# Patient Record
Sex: FEMALE | Race: BLACK OR AFRICAN AMERICAN
[De-identification: names, ages, dates, MRNs, and addresses within clinical notes are randomized per-mention and may not be internally consistent; named-entity substitution may affect disease eponyms.]

---

## 2021-10-08 ENCOUNTER — HOSPITAL ENCOUNTER (INPATIENT)
Dept: HOSPITAL 63 - ER | Age: 86
LOS: 7 days | Discharge: HOSPICE | DRG: 177 | End: 2021-10-15
Attending: INTERNAL MEDICINE | Admitting: INTERNAL MEDICINE
Payer: MEDICARE

## 2021-10-08 VITALS — BODY MASS INDEX: 18.94 KG/M2 | HEIGHT: 61 IN | WEIGHT: 100.31 LBS

## 2021-10-08 VITALS — DIASTOLIC BLOOD PRESSURE: 61 MMHG | SYSTOLIC BLOOD PRESSURE: 101 MMHG

## 2021-10-08 VITALS — SYSTOLIC BLOOD PRESSURE: 96 MMHG | DIASTOLIC BLOOD PRESSURE: 54 MMHG

## 2021-10-08 DIAGNOSIS — I10: ICD-10-CM

## 2021-10-08 DIAGNOSIS — J12.82: ICD-10-CM

## 2021-10-08 DIAGNOSIS — Z51.5: ICD-10-CM

## 2021-10-08 DIAGNOSIS — E78.5: ICD-10-CM

## 2021-10-08 DIAGNOSIS — J12.89: ICD-10-CM

## 2021-10-08 DIAGNOSIS — Z90.49: ICD-10-CM

## 2021-10-08 DIAGNOSIS — U07.1: Primary | ICD-10-CM

## 2021-10-08 DIAGNOSIS — N17.9: ICD-10-CM

## 2021-10-08 DIAGNOSIS — D64.9: ICD-10-CM

## 2021-10-08 DIAGNOSIS — E86.0: ICD-10-CM

## 2021-10-08 DIAGNOSIS — J96.21: ICD-10-CM

## 2021-10-08 DIAGNOSIS — M06.9: ICD-10-CM

## 2021-10-08 DIAGNOSIS — Z88.8: ICD-10-CM

## 2021-10-08 LAB
ALBUMIN SERPL-MCNC: 3 G/DL (ref 3.4–5)
ALBUMIN/GLOB SERPL: 0.7 {RATIO} (ref 1–1.7)
ALP SERPL-CCNC: 64 U/L (ref 46–116)
ALT SERPL-CCNC: 31 U/L (ref 14–59)
ANION GAP SERPL CALC-SCNC: 14 MMOL/L (ref 6–14)
APTT PPP: YELLOW S
AST SERPL-CCNC: 65 U/L (ref 15–37)
BACTERIA #/AREA URNS HPF: 0 /HPF
BASOPHILS # BLD AUTO: 0 X10^3/UL (ref 0–0.2)
BASOPHILS NFR BLD: 0 % (ref 0–3)
BILIRUB SERPL-MCNC: 0.2 MG/DL (ref 0.2–1)
BILIRUB UR QL STRIP: (no result)
BUN/CREAT SERPL: 22 (ref 6–20)
CA-I SERPL ISE-MCNC: 39 MG/DL (ref 7–20)
CALCIUM SERPL-MCNC: 8 MG/DL (ref 8.5–10.1)
CHLORIDE SERPL-SCNC: 100 MMOL/L (ref 98–107)
CO2 SERPL-SCNC: 23 MMOL/L (ref 21–32)
CREAT SERPL-MCNC: 1.8 MG/DL (ref 0.6–1)
EOSINOPHIL NFR BLD: 0 % (ref 0–3)
EOSINOPHIL NFR BLD: 0 X10^3/UL (ref 0–0.7)
ERYTHROCYTE [DISTWIDTH] IN BLOOD BY AUTOMATED COUNT: 16.1 % (ref 11.5–14.5)
FIBRINOGEN PPP-MCNC: (no result) MG/DL
GFR SERPLBLD BASED ON 1.73 SQ M-ARVRAT: 32.4 ML/MIN
GLOBULIN SER-MCNC: 4.3 G/DL (ref 2.2–3.8)
GLUCOSE SERPL-MCNC: 81 MG/DL (ref 70–99)
GLUCOSE UR STRIP-MCNC: (no result) MG/DL
HCT VFR BLD CALC: 34 % (ref 36–47)
HGB BLD-MCNC: 10.9 G/DL (ref 12–15.5)
LYMPHOCYTES # BLD: 0.4 X10^3/UL (ref 1–4.8)
LYMPHOCYTES NFR BLD AUTO: 9 % (ref 24–48)
MCH RBC QN AUTO: 31 PG (ref 25–35)
MCHC RBC AUTO-ENTMCNC: 32 G/DL (ref 31–37)
MCV RBC AUTO: 95 FL (ref 79–100)
MONO #: 0.4 X10^3/UL (ref 0–1.1)
MONOCYTES NFR BLD: 10 % (ref 0–9)
NEUT #: 3.8 X10^3UL (ref 1.8–7.7)
NEUTROPHILS NFR BLD AUTO: 81 % (ref 31–73)
NITRITE UR QL STRIP: (no result)
PLATELET # BLD AUTO: 228 X10^3/UL (ref 140–400)
POTASSIUM SERPL-SCNC: 4.1 MMOL/L (ref 3.5–5.1)
PROT SERPL-MCNC: 7.3 G/DL (ref 6.4–8.2)
RBC # BLD AUTO: 3.57 X10^6/UL (ref 3.5–5.4)
SODIUM SERPL-SCNC: 137 MMOL/L (ref 136–145)
SP GR UR STRIP: 1.01
UROBILINOGEN UR-MCNC: 0.2 MG/DL
WBC # BLD AUTO: 4.6 X10^3/UL (ref 4–11)
WBC #/AREA URNS HPF: (no result) /HPF (ref 0–4)

## 2021-10-08 PROCEDURE — 36415 COLL VENOUS BLD VENIPUNCTURE: CPT

## 2021-10-08 PROCEDURE — 83605 ASSAY OF LACTIC ACID: CPT

## 2021-10-08 PROCEDURE — 85379 FIBRIN DEGRADATION QUANT: CPT

## 2021-10-08 PROCEDURE — 94760 N-INVAS EAR/PLS OXIMETRY 1: CPT

## 2021-10-08 PROCEDURE — 82803 BLOOD GASES ANY COMBINATION: CPT

## 2021-10-08 PROCEDURE — 80053 COMPREHEN METABOLIC PANEL: CPT

## 2021-10-08 PROCEDURE — 87426 SARSCOV CORONAVIRUS AG IA: CPT

## 2021-10-08 PROCEDURE — 85027 COMPLETE CBC AUTOMATED: CPT

## 2021-10-08 PROCEDURE — 81001 URINALYSIS AUTO W/SCOPE: CPT

## 2021-10-08 PROCEDURE — 96365 THER/PROPH/DIAG IV INF INIT: CPT

## 2021-10-08 PROCEDURE — 86140 C-REACTIVE PROTEIN: CPT

## 2021-10-08 PROCEDURE — 93005 ELECTROCARDIOGRAM TRACING: CPT

## 2021-10-08 PROCEDURE — 96361 HYDRATE IV INFUSION ADD-ON: CPT

## 2021-10-08 PROCEDURE — 84484 ASSAY OF TROPONIN QUANT: CPT

## 2021-10-08 PROCEDURE — 85025 COMPLETE CBC W/AUTO DIFF WBC: CPT

## 2021-10-08 PROCEDURE — 36600 WITHDRAWAL OF ARTERIAL BLOOD: CPT

## 2021-10-08 PROCEDURE — 94640 AIRWAY INHALATION TREATMENT: CPT

## 2021-10-08 PROCEDURE — 80048 BASIC METABOLIC PNL TOTAL CA: CPT

## 2021-10-08 PROCEDURE — 71045 X-RAY EXAM CHEST 1 VIEW: CPT

## 2021-10-08 PROCEDURE — 87040 BLOOD CULTURE FOR BACTERIA: CPT

## 2021-10-08 PROCEDURE — 72125 CT NECK SPINE W/O DYE: CPT

## 2021-10-08 PROCEDURE — 96367 TX/PROPH/DG ADDL SEQ IV INF: CPT

## 2021-10-08 PROCEDURE — 71275 CT ANGIOGRAPHY CHEST: CPT

## 2021-10-08 PROCEDURE — 70450 CT HEAD/BRAIN W/O DYE: CPT

## 2021-10-08 RX ADMIN — HEPARIN SODIUM SCH UNIT: 5000 INJECTION, SOLUTION INTRAVENOUS; SUBCUTANEOUS at 22:00

## 2021-10-08 RX ADMIN — HEPARIN SODIUM SCH UNIT: 5000 INJECTION, SOLUTION INTRAVENOUS; SUBCUTANEOUS at 21:45

## 2021-10-08 RX ADMIN — SODIUM CHLORIDE SCH MLS/HR: 0.9 INJECTION, SOLUTION INTRAVENOUS at 21:45

## 2021-10-08 NOTE — PHYS DOC
General Adult


EDM:


Chief Complaint:  WEAKNESS/GENERALIZED





HPI:


HPI:


Patient is an 86-year-old female who presents to the emergency department for 

generalized weakness, shortness of breath, generalized chest pain, nonproductive

cough, fevers for 2 weeks.  Patient lives at home and walks with a cane or a wal

ker.  Patient denies any sick exposures.  She is not vaccinated for COVID-19.  

She denies any nausea, vomiting, dysuria.  Patient states that she fell 2 days 

ago because she was weak, she reports hitting her head but denies loss of 

consciousness, no blood thinner use.  Patient states she does not wear oxygen at

home.  Upon ER arrival patient is noted to be febrile and hypoxic with room air 

oxygen saturation of 83%.





Review of Systems:


Review of Systems:


14 body systems of the review of systems have been reviewed.  See HPI for 

pertinent positive and negative responses, otherwise all other systems are n

egative, nonpertinent or noncontributory





Physical Exam:


PE:





Constitutional: Well developed, well nourished, no acute distress, non-toxic 

appearance. []


HENT: Normocephalic, ecchymosis noted to right frontal aspect of head, no 

rhinorrhea, bilateral external ears normal, oropharynx moist, no oral exudates, 

nose normal. []


Eyes: PERRLA, EOMI, conjunctiva normal, no discharge. [] 


Neck: Normal range of motion, no tenderness, supple, no stridor. [] 


Cardiovascular:Heart rate regular rhythm, no murmur []


Lungs & Thorax:  Bilateral breath sounds clear but diminished to auscultation, 

hypoxic []


Abdomen: Bowel sounds normal, soft, no tenderness, no masses, no pulsatile 

masses. [] 


Skin: Warm, dry, no erythema, no rash. [] 


Back: Normal range of motion


Extremities: No tenderness, no cyanosis, no clubbing, ROM intact, no edema, 

arthritic changes in hands bilaterally. [] 


Neurologic: Alert and oriented X 3, normal motor function, normal sensory 

function, no focal deficits noted, patient moving all 4 extremities equally. []


Psychologic: Affect normal, judgement normal, mood normal. []





Current Patient Data:


Labs:





Laboratory Tests








Test


 10/8/21


14:00


 


White Blood Count 4.6 x10^3/uL 


 


Red Blood Count 3.57 x10^6/uL 


 


Hemoglobin 10.9 g/dL 


 


Hematocrit 34.0 % 


 


Mean Corpuscular Volume 95 fL 


 


Mean Corpuscular Hemoglobin 31 pg 


 


Mean Corpuscular Hemoglobin


Concent 32 g/dL 





 


Red Cell Distribution Width 16.1 % 


 


Platelet Count 228 x10^3/uL 


 


Neutrophils (%) (Auto) 81 % 


 


Lymphocytes (%) (Auto) 9 % 


 


Monocytes (%) (Auto) 10 % 


 


Eosinophils (%) (Auto) 0 % 


 


Basophils (%) (Auto) 0 % 


 


Neutrophils # (Auto) 3.8 x10^3uL 


 


Lymphocytes # (Auto) 0.4 x10^3/uL 


 


Monocytes # (Auto) 0.4 x10^3/uL 


 


Eosinophils # (Auto) 0.0 x10^3/uL 


 


Basophils # (Auto) 0.0 x10^3/uL 


 


Sodium Level 137 mmol/L 


 


Potassium Level 4.1 mmol/L 


 


Chloride Level 100 mmol/L 


 


Carbon Dioxide Level 23 mmol/L 


 


Anion Gap 14 


 


Blood Urea Nitrogen 39 mg/dL 


 


Creatinine 1.8 mg/dL 


 


Estimated GFR


(Cockcroft-Gault) 32.4 





 


BUN/Creatinine Ratio 22 


 


Glucose Level 81 mg/dL 


 


Calcium Level 8.0 mg/dL 


 


Total Bilirubin 0.2 mg/dL 


 


Aspartate Amino Transf


(AST/SGOT) 65 U/L 





 


Alanine Aminotransferase


(ALT/SGPT) 31 U/L 





 


Alkaline Phosphatase 64 U/L 


 


Troponin I Quantitative < 0.017 ng/mL 


 


Total Protein 7.3 g/dL 


 


Albumin 3.0 g/dL 


 


Albumin/Globulin Ratio 0.7 








Current Medications








 Medications


  (Trade)  Dose


 Ordered  Sig/Anusha


 Route


 PRN Reason  Start Time


 Stop Time Status Last Admin


Dose Admin


 


 Sodium Chloride  1,000 ml @ 


 1,000 mls/hr  Q1H


 IV


   10/8/21 13:45


 10/8/21 14:44 DC 10/8/21 13:45





 


 Ceftriaxone


 Sodium 1 gm/


 Sodium Chloride  50 ml @ 


 100 mls/hr  1X  ONCE


 IV


   10/8/21 14:45


 10/8/21 15:14 DC  





 


 Azithromycin 500


 mg/Sodium Chloride  250 ml @ 


 250 mls/hr  1X  ONCE


 IV


   10/8/21 14:45


 10/8/21 15:44   





 


 Acetaminophen


  (Tylenol)  650 mg  1X  ONCE


 PO


   10/8/21 14:45


 10/8/21 14:49 DC  





 


 Sodium Chloride  50 ml @ As


 Directed  STK-MED ONCE


 .ROUTE


   10/8/21 15:07


 10/8/21 15:07 DC  





 


 Ceftriaxone Sodium


  (Rocephin)  1 gm  STK-MED ONCE


 .ROUTE


   10/8/21 15:07


 10/8/21 15:07 DC  





 


 Sodium Chloride  250 ml @ 


 As Directed  STK-MED ONCE


 .ROUTE


   10/8/21 15:09


 10/8/21 15:09 DC  





 


 Azithromycin


  (Zithromax)  500 mg  STK-MED ONCE


 IV


   10/8/21 15:09


 10/8/21 15:09 DC  














EKG:


EKG:


EKG performed by ER staff at 1503 shows sinus rhythm with a rate of 95, no STEMI

 read by Dr. Rob at 1510 [] EKG repeat EKG performed at 1640 shows a sinus 

rhythm with a rate of 96, no STEMI read by Dr. Rob at 1645.





Radiology/Procedures:


Radiology/Procedures:


[]PROCEDURE: CT HEAD AND CERVICAL SPINE WO





EXAM: Head and cervical spine CT without contrast.





HISTORY: Fall.





TECHNIQUE: Computed tomographic images of the head and cervical spine were 

obtained without contrast.





*One or more of the following individualized dose reduction techniques were util

ized for this examination:  


1. Automated exposure control.  


2. Adjustment of the mA and/or kV according to patient size.  


3. Use of iterative reconstruction technique.





COMPARISON: None.





FINDINGS: 





Head: There is a small right frontal scalp soft tissue hematoma. There is no 

intracranial hemorrhage. There is no mass effect or midline shift. There is no 

hydrocephalus. There is cerebral volume loss. There is decreased attenuation 

within the cerebral white matter, likely due to chronic small vessel disease. 

There are basal ganglia calcifications. There is evidence of lens surgery. The 

visualized paranasal sinuses and mastoid air cells are unremarkable. There is no

 suspicious calvarial lesion. There is a small benign osseous excrescence along 

the posterior right frontal bone near the vertex, likely a small osteoma.





Cervical spine: There is minimal multilevel degenerative listhesis. There is 

mild multilevel endplate remodeling. There is facet arthropathy at multiple 

levels. There is no fracture. There is suspected bone demineralization. There is

 multifocal groundglass opacity within the bilateral lung apices, not formally 

assessed on this exam. The degenerative changes results in mild left foraminal 

stenosis at C4-C5 and moderate right and mild left foraminal stenosis at C5-C6.





IMPRESSION:


1. Small right frontal scalp soft tissue hematoma. There is no acute 

intracranial finding.


2. Multilevel degenerative change involving the cervical spine, described in 

detail above. There is no acute cervical spine finding.


3. Cerebral volume loss and bilateral cerebral white matter changes likely due 

to chronic small vessel disease.


4. Multifocal groundglass opacities within the bilateral lung apices. Correlate 

with a dedicated chest radiograph or chest CT.





Electronically signed by: Marni Chacko MD (10/8/2021 2:32 PM) [id number]














DICTATED AND SIGNED BY:     MARNI CHACKO MD


DATE:     10/08/21 1429





CC: DERIK NEWSOME; LOREN MCMANUS MD ~MTH0 0


PROCEDURE: PORTABLE CHEST 1V





Single AP view of the chest.





Comparison:  None.





Indication: Cough and shortness of air





Findings: 





The aorta is calcified and tortuous The heart is enlarged.  There is no 

pneumothorax or effusion. There are coarse bilateral interstitial opacities. 

Additionally there appears to be a 1.8 x 1.2 cm mass in left upper lung zone.





Impression: 





1. Coarse interstitial opacities bilaterally likely chronic scarring and 

fibrosis. Additionally appears to be left upper lobe mass. Recommend further 

correlation with cross-sectional imaging.








Electronically signed by: Tanner Bauer MD (10/8/2021 1:53 PM) [id number]














DICTATED AND SIGNED BY:     TANNER BAUER MD


DATE:     10/08/21 1351





CC: DERIK NEWSOME; LOREN MCMANUS MD ~MTH0 0





Heart Score:


C/O Chest Pain:  Yes


HEART Score for Chest Pain:  








HEART Score for Chest Pain Response (Comments) Value


 


History Slighlty/Non-Suspicious (gernalized x2 weeks with cough) 0


 


ECG Normal 0


 


Age > 65 2


 


Risk Factors                            1 or 2 Risk Factors 1


 


Troponin < Normal Limit 0


 


Total  3








Risk Factors:


Risk Factors:  DM, Current or recent (<one month) smoker, HTN, HLP, family his

tory of CAD, obesity.


Risk Scores:


Score 0 - 3:  2.5% MACE over next 6 weeks - Discharge Home


Score 4 - 6:  20.3% MACE over next 6 weeks - Admit for Clinical Observation


Score 7 - 10:  72.7% MACE over next 6 weeks - Early Invasive Strategies





Course & Med Decision Making:


Course & Med Decision Making


Pertinent Labs and Imaging studies reviewed. (See chart for details)


Patient is an 86-year-old female who presents to the emergency department for 

generalized weakness, shortness of breath, cough, fevers, generalized chest pain

 x2 weeks.  Work-up in the ER consisted of blood work, urinalysis, chest x-ray, 

EKG.  Patient stated that she fell 2 days ago and hit her head, CT head and neck

 was performed.  Patient treated with IV fluids.  Patient CT scan of head and 

neck were negative for any acute findings, her chest x-ray showed bilateral 

opacities with a 1.8 x 1.2 cm lung mass.  Patient treated with antibiotics. 

Fever treated in ER.  Patient was Covid tested in the ER and it is pending at 

this time.  Negative troponin.  BUN and creatinine are mildly elevated.  I 

discussed patient's case with Dr. Hicks who agreed to admit the patient under his

 services for pneumonia, generalized weakness, hypoxia, PUI.  I notified patient

 of her lab findings and her treatment plan and she is agreeable at this time.  

Patient's vital signs continue to be stable on supplemental oxygen.  Care 

transferred at this time 1528.





Dragon Disclaimer:


Dragon Disclaimer:


This electronic medical record was generated, in whole or in part, using a voice

 recognition dictation system.





Departure


Departure:


Impression:  


   Primary Impression:  


   Pneumonia


   Qualified Codes:  J18.9 - Pneumonia, unspecified organism


   Additional Impressions:  


   Person under investigation for COVID-19


   Weakness


   Hypoxia


Disposition:  09 ADMITTED AS INPATIENT


Admitting Physician:  Geovanna Hicks


Condition:  STABLE


Referrals:  


LOREN MCMANUS MD (PCP)











DERIK NEWSOME           Oct 8, 2021 13:50

## 2021-10-08 NOTE — RAD
EXAM: Head and cervical spine CT without contrast.



HISTORY: Fall.



TECHNIQUE: Computed tomographic images of the head and cervical spine were obtained without contrast.




*One or more of the following individualized dose reduction techniques were utilized for this examina
tion:  

1. Automated exposure control.  

2. Adjustment of the mA and/or kV according to patient size.  

3. Use of iterative reconstruction technique.



COMPARISON: None.



FINDINGS: 



Head: There is a small right frontal scalp soft tissue hematoma. There is no intracranial hemorrhage.
 There is no mass effect or midline shift. There is no hydrocephalus. There is cerebral volume loss. 
There is decreased attenuation within the cerebral white matter, likely due to chronic small vessel d
isease. There are basal ganglia calcifications. There is evidence of lens surgery. The visualized par
anasal sinuses and mastoid air cells are unremarkable. There is no suspicious calvarial lesion. There
 is a small benign osseous excrescence along the posterior right frontal bone near the vertex, likely
 a small osteoma.



Cervical spine: There is minimal multilevel degenerative listhesis. There is mild multilevel endplate
 remodeling. There is facet arthropathy at multiple levels. There is no fracture. There is suspected 
bone demineralization. There is multifocal groundglass opacity within the bilateral lung apices, not 
formally assessed on this exam. The degenerative changes results in mild left foraminal stenosis at C
4-C5 and moderate right and mild left foraminal stenosis at C5-C6.



IMPRESSION:

1. Small right frontal scalp soft tissue hematoma. There is no acute intracranial finding.

2. Multilevel degenerative change involving the cervical spine, described in detail above. There is n
o acute cervical spine finding.

3. Cerebral volume loss and bilateral cerebral white matter changes likely due to chronic small vesse
l disease.

4. Multifocal groundglass opacities within the bilateral lung apices. Correlate with a dedicated ches
t radiograph or chest CT.



Electronically signed by: Marni Harris MD (10/8/2021 2:32 PM) [id number]

## 2021-10-08 NOTE — EKG
[facility] [address], [place] [address]

Test Date:    2021-10-08               Test Time:    13:58:03

Pat Name:     DELFINA CORDOVA               Department:   

Patient ID:   [id number]           Room:          

Gender:       F                        Technician:   TAIWO

:          1935               Requested By: DERIK NEWSOME

Order Number: [id number]            Reading MD:   Norman Brooks

                                 Measurements

Intervals                              Axis          

Rate:         103                      P:            40

TN:           172                      QRS:          12

QRSD:         72                       T:            31

QT:           314                                    

QTc:          413                                    

                           Interpretive Statements

SINUS TACHYCARDIA

LOW LIMB LEAD VOLTAGE

QRS(T) CONTOUR ABNORMALITY

CONSISTENT WITH INFERIOR INFARCT

Electronically Signed On 10- 16:48:16 CDT by Norman Brooks

## 2021-10-08 NOTE — EKG
[facility] [address], [place] [address]

Test Date:    2021-10-08               Test Time:    15:03:10

Pat Name:     DELFINA CORDOVA               Department:   

Patient ID:   [id number]           Room:          

Gender:       F                        Technician:   TAIWO

:          1935               Requested By: DERIK NEWSOME

Order Number: [id number]            Reading MD:   Norman Brooks

                                 Measurements

Intervals                              Axis          

Rate:         95                       P:            49

VT:           174                      QRS:          13

QRSD:         80                       T:            42

QT:           334                                    

QTc:          423                                    

                           Interpretive Statements

SINUS RHYTHM

LOW LIMB LEAD VOLTAGE

Electronically Signed On 10- 16:47:49 CDT by Norman Brooks

## 2021-10-08 NOTE — RAD
Single AP view of the chest.



Comparison:  None.



Indication: Cough and shortness of air



Findings: 



The aorta is calcified and tortuous The heart is enlarged.  There is no pneumothorax or effusion. The
re are coarse bilateral interstitial opacities. Additionally there appears to be a 1.8 x 1.2 cm mass 
in left upper lung zone.



Impression: 



1. Coarse interstitial opacities bilaterally likely chronic scarring and fibrosis. Additionally appea
rs to be left upper lobe mass. Recommend further correlation with cross-sectional imaging.





Electronically signed by: Tanner Bauer MD (10/8/2021 1:53 PM) [id number]

## 2021-10-09 VITALS — SYSTOLIC BLOOD PRESSURE: 112 MMHG | DIASTOLIC BLOOD PRESSURE: 65 MMHG

## 2021-10-09 VITALS — SYSTOLIC BLOOD PRESSURE: 148 MMHG | DIASTOLIC BLOOD PRESSURE: 64 MMHG

## 2021-10-09 VITALS — DIASTOLIC BLOOD PRESSURE: 63 MMHG | SYSTOLIC BLOOD PRESSURE: 116 MMHG

## 2021-10-09 VITALS — SYSTOLIC BLOOD PRESSURE: 112 MMHG | DIASTOLIC BLOOD PRESSURE: 62 MMHG

## 2021-10-09 VITALS — DIASTOLIC BLOOD PRESSURE: 73 MMHG | SYSTOLIC BLOOD PRESSURE: 144 MMHG

## 2021-10-09 LAB
ALBUMIN SERPL-MCNC: 2.4 G/DL (ref 3.4–5)
ALBUMIN/GLOB SERPL: 0.6 {RATIO} (ref 1–1.7)
ALP SERPL-CCNC: 63 U/L (ref 46–116)
ALT SERPL-CCNC: 33 U/L (ref 14–59)
ANION GAP SERPL CALC-SCNC: 10 MMOL/L (ref 6–14)
AST SERPL-CCNC: 70 U/L (ref 15–37)
BASOPHILS # BLD AUTO: 0 X10^3/UL (ref 0–0.2)
BASOPHILS NFR BLD: 0 % (ref 0–3)
BILIRUB SERPL-MCNC: 0.2 MG/DL (ref 0.2–1)
BUN/CREAT SERPL: 21 (ref 6–20)
CA-I SERPL ISE-MCNC: 27 MG/DL (ref 7–20)
CALCIUM SERPL-MCNC: 7.1 MG/DL (ref 8.5–10.1)
CHLORIDE SERPL-SCNC: 110 MMOL/L (ref 98–107)
CO2 SERPL-SCNC: 22 MMOL/L (ref 21–32)
CREAT SERPL-MCNC: 1.3 MG/DL (ref 0.6–1)
EOSINOPHIL NFR BLD: 0 % (ref 0–3)
EOSINOPHIL NFR BLD: 0 X10^3/UL (ref 0–0.7)
ERYTHROCYTE [DISTWIDTH] IN BLOOD BY AUTOMATED COUNT: 16.2 % (ref 11.5–14.5)
GFR SERPLBLD BASED ON 1.73 SQ M-ARVRAT: 47.2 ML/MIN
GLOBULIN SER-MCNC: 3.9 G/DL (ref 2.2–3.8)
GLUCOSE SERPL-MCNC: 62 MG/DL (ref 70–99)
HCT VFR BLD CALC: 30.9 % (ref 36–47)
HGB BLD-MCNC: 9.9 G/DL (ref 12–15.5)
LYMPHOCYTES # BLD: 0.6 X10^3/UL (ref 1–4.8)
LYMPHOCYTES NFR BLD AUTO: 15 % (ref 24–48)
MCH RBC QN AUTO: 31 PG (ref 25–35)
MCHC RBC AUTO-ENTMCNC: 32 G/DL (ref 31–37)
MCV RBC AUTO: 96 FL (ref 79–100)
MONO #: 0.3 X10^3/UL (ref 0–1.1)
MONOCYTES NFR BLD: 8 % (ref 0–9)
NEUT #: 3.2 X10^3UL (ref 1.8–7.7)
NEUTROPHILS NFR BLD AUTO: 77 % (ref 31–73)
PLATELET # BLD AUTO: 227 X10^3/UL (ref 140–400)
POTASSIUM SERPL-SCNC: 3.9 MMOL/L (ref 3.5–5.1)
PROT SERPL-MCNC: 6.3 G/DL (ref 6.4–8.2)
RBC # BLD AUTO: 3.21 X10^6/UL (ref 3.5–5.4)
SODIUM SERPL-SCNC: 142 MMOL/L (ref 136–145)
WBC # BLD AUTO: 4.2 X10^3/UL (ref 4–11)

## 2021-10-09 PROCEDURE — XW033E5 INTRODUCTION OF REMDESIVIR ANTI-INFECTIVE INTO PERIPHERAL VEIN, PERCUTANEOUS APPROACH, NEW TECHNOLOGY GROUP 5: ICD-10-PCS | Performed by: INTERNAL MEDICINE

## 2021-10-09 RX ADMIN — SODIUM CHLORIDE SCH MLS/HR: 0.9 INJECTION, SOLUTION INTRAVENOUS at 05:30

## 2021-10-09 RX ADMIN — SODIUM CHLORIDE SCH MLS/HR: 0.9 INJECTION, SOLUTION INTRAVENOUS at 17:03

## 2021-10-09 RX ADMIN — DEXAMETHASONE SODIUM PHOSPHATE SCH MG: 10 INJECTION INTRAMUSCULAR; INTRAVENOUS at 20:01

## 2021-10-09 RX ADMIN — HEPARIN SODIUM SCH UNIT: 5000 INJECTION, SOLUTION INTRAVENOUS; SUBCUTANEOUS at 20:00

## 2021-10-09 RX ADMIN — HEPARIN SODIUM SCH UNIT: 5000 INJECTION, SOLUTION INTRAVENOUS; SUBCUTANEOUS at 14:34

## 2021-10-09 RX ADMIN — HEPARIN SODIUM SCH UNIT: 5000 INJECTION, SOLUTION INTRAVENOUS; SUBCUTANEOUS at 05:30

## 2021-10-09 RX ADMIN — ACETAMINOPHEN PRN MG: 325 TABLET, FILM COATED ORAL at 15:24

## 2021-10-09 RX ADMIN — AZITHROMYCIN SCH MG: 250 TABLET, FILM COATED ORAL at 08:40

## 2021-10-10 VITALS — DIASTOLIC BLOOD PRESSURE: 82 MMHG | SYSTOLIC BLOOD PRESSURE: 164 MMHG

## 2021-10-10 VITALS — DIASTOLIC BLOOD PRESSURE: 62 MMHG | SYSTOLIC BLOOD PRESSURE: 128 MMHG

## 2021-10-10 VITALS — DIASTOLIC BLOOD PRESSURE: 60 MMHG | SYSTOLIC BLOOD PRESSURE: 129 MMHG

## 2021-10-10 VITALS — DIASTOLIC BLOOD PRESSURE: 61 MMHG | SYSTOLIC BLOOD PRESSURE: 143 MMHG

## 2021-10-10 VITALS — DIASTOLIC BLOOD PRESSURE: 68 MMHG | SYSTOLIC BLOOD PRESSURE: 149 MMHG

## 2021-10-10 VITALS — DIASTOLIC BLOOD PRESSURE: 76 MMHG | SYSTOLIC BLOOD PRESSURE: 175 MMHG

## 2021-10-10 VITALS — DIASTOLIC BLOOD PRESSURE: 76 MMHG | SYSTOLIC BLOOD PRESSURE: 173 MMHG

## 2021-10-10 VITALS — SYSTOLIC BLOOD PRESSURE: 163 MMHG | DIASTOLIC BLOOD PRESSURE: 72 MMHG

## 2021-10-10 VITALS — SYSTOLIC BLOOD PRESSURE: 167 MMHG | DIASTOLIC BLOOD PRESSURE: 82 MMHG

## 2021-10-10 VITALS — DIASTOLIC BLOOD PRESSURE: 63 MMHG | SYSTOLIC BLOOD PRESSURE: 133 MMHG

## 2021-10-10 VITALS — DIASTOLIC BLOOD PRESSURE: 80 MMHG | SYSTOLIC BLOOD PRESSURE: 181 MMHG

## 2021-10-10 VITALS — SYSTOLIC BLOOD PRESSURE: 144 MMHG | DIASTOLIC BLOOD PRESSURE: 67 MMHG

## 2021-10-10 VITALS — SYSTOLIC BLOOD PRESSURE: 129 MMHG | DIASTOLIC BLOOD PRESSURE: 61 MMHG

## 2021-10-10 VITALS — SYSTOLIC BLOOD PRESSURE: 132 MMHG | DIASTOLIC BLOOD PRESSURE: 78 MMHG

## 2021-10-10 VITALS — SYSTOLIC BLOOD PRESSURE: 172 MMHG | DIASTOLIC BLOOD PRESSURE: 76 MMHG

## 2021-10-10 VITALS — DIASTOLIC BLOOD PRESSURE: 79 MMHG | SYSTOLIC BLOOD PRESSURE: 170 MMHG

## 2021-10-10 VITALS — DIASTOLIC BLOOD PRESSURE: 57 MMHG | SYSTOLIC BLOOD PRESSURE: 126 MMHG

## 2021-10-10 VITALS — SYSTOLIC BLOOD PRESSURE: 154 MMHG | DIASTOLIC BLOOD PRESSURE: 129 MMHG

## 2021-10-10 VITALS — SYSTOLIC BLOOD PRESSURE: 166 MMHG | DIASTOLIC BLOOD PRESSURE: 76 MMHG

## 2021-10-10 LAB
ANION GAP SERPL CALC-SCNC: 16 MMOL/L (ref 6–14)
BGAS PCO2: 26 MMHG (ref 35–45)
BGAS PH: 7.37 (ref 7.35–7.45)
BGAS PO2: 91 MMHG (ref 71–100)
CA-I SERPL ISE-MCNC: 21 MG/DL (ref 7–20)
CALCIUM SERPL-MCNC: 6.9 MG/DL (ref 8.5–10.1)
CHLORIDE SERPL-SCNC: 112 MMOL/L (ref 98–107)
CO2 SERPL-SCNC: 15 MMOL/L (ref 21–32)
CREAT SERPL-MCNC: 1 MG/DL (ref 0.6–1)
DELTA BASE BGAS: -10 MMOL/L (ref 0–3)
GFR SERPLBLD BASED ON 1.73 SQ M-ARVRAT: 63.9 ML/MIN
GLUCOSE SERPL-MCNC: 51 MG/DL (ref 70–99)
O2 SAT BGAS: 97 % (ref 92–99)
O2/TOTAL GAS SETTING VFR VENT: 100 %
POTASSIUM SERPL-SCNC: 3.9 MMOL/L (ref 3.5–5.1)
SODIUM SERPL-SCNC: 143 MMOL/L (ref 136–145)

## 2021-10-10 PROCEDURE — 5A0935A ASSISTANCE WITH RESPIRATORY VENTILATION, LESS THAN 24 CONSECUTIVE HOURS, HIGH NASAL FLOW/VELOCITY: ICD-10-PCS | Performed by: INTERNAL MEDICINE

## 2021-10-10 RX ADMIN — DEXAMETHASONE SODIUM PHOSPHATE SCH MG: 10 INJECTION INTRAMUSCULAR; INTRAVENOUS at 22:26

## 2021-10-10 RX ADMIN — Medication SCH EA: at 21:00

## 2021-10-10 RX ADMIN — MORPHINE SULFATE PRN MG: 4 INJECTION, SOLUTION INTRAMUSCULAR; INTRAVENOUS at 11:43

## 2021-10-10 RX ADMIN — ACETAMINOPHEN PRN MG: 325 TABLET, FILM COATED ORAL at 07:20

## 2021-10-10 RX ADMIN — AZITHROMYCIN SCH MG: 250 TABLET, FILM COATED ORAL at 10:00

## 2021-10-10 RX ADMIN — REMDESIVIR SCH MLS/HR: 100 INJECTION, POWDER, LYOPHILIZED, FOR SOLUTION INTRAVENOUS at 11:42

## 2021-10-10 RX ADMIN — SODIUM CHLORIDE SCH MLS/HR: 0.9 INJECTION, SOLUTION INTRAVENOUS at 03:23

## 2021-10-10 RX ADMIN — HEPARIN SODIUM SCH UNIT: 5000 INJECTION, SOLUTION INTRAVENOUS; SUBCUTANEOUS at 15:10

## 2021-10-10 RX ADMIN — ENOXAPARIN SODIUM SCH MG: 100 INJECTION SUBCUTANEOUS at 22:27

## 2021-10-10 RX ADMIN — HEPARIN SODIUM SCH UNIT: 5000 INJECTION, SOLUTION INTRAVENOUS; SUBCUTANEOUS at 05:30

## 2021-10-10 RX ADMIN — MORPHINE SULFATE PRN MG: 4 INJECTION, SOLUTION INTRAMUSCULAR; INTRAVENOUS at 18:03

## 2021-10-10 RX ADMIN — LIDOCAINE SCH PATCH: 50 PATCH CUTANEOUS at 11:41

## 2021-10-10 RX ADMIN — ACETAMINOPHEN PRN MG: 325 TABLET, FILM COATED ORAL at 22:34

## 2021-10-10 NOTE — PN
DATE: 10/09/2021

SUBJECTIVE:  The patient was admitted yesterday with cough, fever, shortness of 

breath and chest pain, was diagnosed with coronavirus pneumonia with 

community-acquired pneumonia, acute hypoxic respiratory failure, severe chronic 

deforming rheumatoid arthritis and possible left upper lobe mass.



PHYSICAL EXAMINATION:

GENERAL:  When I saw her this morning, she continued to be febrile, pale.  No 

jaundice, cyanosis or thyromegaly.  No jugular venous distention.  No limb 

edema.

VITAL SIGNS:  Her heart rate was 98, blood pressure is 148/64, temperature was 

102.8, respiratory rate was 18 and oxygen saturation was 91% on 5 liters by 

nasal cannula.  The rest of the clinical exam stable.



ASSESSMENT:

1.  Acute hypoxic respiratory failure.

2.  COVID-19 pneumonia.

3.  Community-acquired pneumonia.

4.  Severe deforming rheumatoid arthritis.

5.  Left upper lobe mass.

6.  Hypertension.

7.  Hyperlipidemia.



PLAN:  My plan is to continue with IV antibiotic in the form of ceftriaxone and 

Zithromax.  Continue with remdesivir and dexamethasone.  Continue with heparin 

for DVT prophylaxis.  Monitor her labs closely and decide the further management

accordingly.  She also has acute kidney injury as her creatinine came down from 

1.8-1.3.  We will continue with also IV fluid.







AMM/MAR

DR: AMM/nts   DD: 10/09/2021 16:14

DT: 10/09/2021 23:57   TID: [id number]

## 2021-10-10 NOTE — RAD
EXAM: Chest, single view.



HISTORY: Covid 19.



COMPARISON: 10/8/2021



FINDINGS: A frontal view of the chest is obtained. There has been interval increase in partially cons
olidated multifocal bilateral infiltrate superimposed on interstitial infiltrate. There are suspected
 small pleural effusions. There is a stable prominent cardiac silhouette. There are right axillary cl
ips.



IMPRESSION: Increase in bilateral multifocal partial consolidation superimposed on diffuse interstiti
al infiltrate and small pleural effusions.



Electronically signed by: Marni Harris MD (10/10/2021 8:49 AM) [id number]

## 2021-10-10 NOTE — HP
ADMIT DATE: 10/08/2021

HISTORY OF PRESENT ILLNESS:  The patient is an 86-year-old [place] 

female patient who came to the Emergency Room with a complaint of generalized 

weakness, shortness of breath, generalized chest pain, nonproductive cough and 

fever for the last 2 weeks. She lives at home and walks with a cane or a walker.

 The patient denied any sick exposure.  She is not vaccinated for COVID-19, as 

she was advised not to by her rheumatologist.  She denied any nausea, vomiting, 

dysuria.  She had a fall 2 days ago because she was weak, and she reports 

hitting her head, but denies any loss of consciousness.  She is not on any blood

thinner.  She is not normally on oxygen at home.  When she arrived to the 

Emergency Room, she was febrile and hypoxic with oxygen saturation of 83% on 

room air.  She was extensively investigated, has had lab work and imaging 

studies.  Her chemistry showed that she was dehydrated with a BUN of 39 and 

creatinine 1.8.  Her chemistry showed that she has normochromic normocytic 

anemia with a normal leukocyte and platelets.  Her D-dimer was high at 1.15.  

Urinalysis showed that she has large amount of blood, but negative for leukocyte

esterase, no wbc's and no bacteria and her coronavirus by rapid testing was 

positive.  Did have a chest x-ray, which basically showed that the patient has 

coarse interstitial opacities bilaterally, likely chronic scarring and fibrosis.

 Additionally, appears to be left upper lobe mass that was 1.8 x 1.2 cm mass in 

the left upper lung zone.  The patient was admitted with a diagnosis of 

community-acquired pneumonia versus coronavirus, COVID-19 pneumonia.  She has 

chronic deforming rheumatoid arthritis, hypertension, hyperlipidemia.  She was 

treated with IV antibiotic in the form of Rocephin and Zithromax.  She was also 

treated here with remdesivir and dexamethasone as well as heparin 5000 units 

subQ every 8 hours as her kidney function was abnormal.



PAST MEDICAL HISTORY:  Significant for chronic deforming rheumatoid arthritis, 

hypertension, hyperlipidemia.



PAST SURGICAL HISTORY:  Significant for cholecystectomy.



ALLERGIES:  SHE IS ALLERGIC TO HUMIRA, PLAQUENIL AND DILAUDID.



MEDICATIONS:  She is currently on following medications:  She is on 

sulfasalazine 500 mg twice a day, acetaminophen 500 mg daily, calcium carbonate 

with vitamin D3 one tablet once a day, and atorvastatin 80 mg at bedtime.



FAMILY HISTORY:  Noncontributory.



SOCIAL HISTORY:  She is single, lives alone, has no children.  Never .  

She never smoked, never drank alcohol or used recreational drugs.  She was a 

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



REVIEW OF SYSTEMS:  The patient denied any blurring of vision, cataracts, 

glaucoma or macular degeneration.  Denied any earache, tinnitus or sensorineural

deafness.  Denied any nosebleed, stuffy nose or postnasal drip.  Denied any sore

throat, sore tongue, toothache, hoarseness of voice or difficulty swallowing.  

Denied any nausea, vomiting, diarrhea or constipation.  Denied any hematemesis, 

melena or hematochezia.  Denied any dysuria, frequency or hematuria.  Did 

complain of chest pain, shortness of breath, cough that is mostly dry.  She has 

also complained of fever.



PHYSICAL EXAMINATION:

GENERAL:  On arrival to the Emergency Room, the patient was pale. No jaundice, 

cyanosis, no lymphadenopathy, no thyromegaly, no jugular venous distention.  No 

limb edema.

VITAL SIGNS:  Her heart rate was 104, blood pressure was 132/72, temperature 

102.1, respiratory rate was 22 and oxygen saturation was 83% on room air, it has

improved to 96% on 5 liters of oxygen.

HEAD, EYES, EARS, NOSE, AND THROAT:  Normocephalic, atraumatic.

NECK:  Supple.

HEART:  Normal first and second heart sounds.  No gallop, rub or murmur.

CHEST:  Shows central trachea, equal bilateral chest expansion, air entry, 

vesicular breath sounds.  I could not appreciate any crepitation or rhonchi 

anteriorly.  She has bilateral basal crepitation, more so on the right than 

left.

ABDOMEN:  Distended, soft, nontender.

NEUROLOGIC:  She is awake, alert, responding appropriately.  All cranial nerves 

intact.  She moves extremities without difficulty.  She does have bruises on the

right forehead after she fell and hit her head.  Examination of the joint showed

that she has severe deforming rheumatoid arthritis.



LABORATORY DATA:  Her lab work showed a white cell count of 4600, hemoglobin 11,

hematocrit 34, MCV 95 and platelet count 228,000 with a manual differential 

showed 81% polymorphs, 9% lymphocytes and 10% monocytes.  Her D-dimer was 1.15. 

Her chemistry showed a serum sodium 137, potassium 4.1, chloride 100, 

bicarbonate 23, anion gap of 14, BUN 39, creatinine 1.8.  Estimated GFR was 32 

mL per minute.  Her glucose was 81, calcium was 8.  Total bilirubin, AST, ALT, 

alkaline phosphatase are normal.  Total protein 7.3, albumin 3.  Urinalysis was 

essentially unremarkable and her coronavirus by rapid testing was positive.



IMAGING STUDIES:  She did have a chest x-ray, which basically showed that she 

has coarse interstitial opacities bilaterally, likely chronic scarring and 

fibrosis.  The heart is enlarged.  There is no pneumothorax or pleural effusion.

 There appears to be a 1.8 x 1.2 cm mass in the left upper lung zone.



CT scan of the head and cervical spine showed no evidence of any cervical spine 

fracture or dislocation.  There is small right frontal scalp soft tissue 

hematoma.  There is no acute intracranial finding.  Cerebral volume loss and 

bilateral cerebral white matter changes, likely due to chronic small vessel 

disease, multifocal ground glass opacities in the bilateral lung apices.



ASSESSMENT AND PLAN:  The patient was admitted with COVID-19 pneumonia with 

community-acquired pneumonia superimposed on it, acute hypoxic respiratory 

failure, possible mass in the left upper lobe, acute kidney injury, severe 

deforming rheumatoid arthritis, hypertension, hyperlipidemia.  The patient was 

started on IV remdesivir, dexamethasone, ceftriaxone and Zithromax.  We will 

continue with all these medications together with heparin and reconcile all her 

medications and follow her closely.







THOR/BELÉN/ONIEL

DR: AMM/nts   DD: 10/09/2021 16:11

DT: 10/09/2021 17:02   TID: [id number]

## 2021-10-10 NOTE — PN
DATE: 10/10/2021

SUBJECTIVE:  The patient has become more short of breath and her oxygen 

requirement has dramatically worsened.  This morning, she was initially on 5 

liters, that has dramatically risen to 8 and up to 15 liters and therefore, the 

patient was transferred to ICU where she was put in a 15 liter nonrebreather 

mask.  On questioning her about some shortness of breath, she is also 

complaining of right-sided chest pain on the right side posteriorly, aggravated 

by taking a deep breath and cough.



PHYSICAL EXAMINATION:

GENERAL:  When I examined her this morning, she was pale, no jaundice, cyanosis,

no lymphadenopathy, no thyromegaly, no jugular venous distention.  No lower limb

edema.

VITAL SIGNS:  Her heart rate was 80, blood pressure is 132/78, temperature was 

99.3, her respiratory rate was 24 and oxygen saturation was 96% on 15 liters by 

nonrebreather mask.  In fact, when I saw her, she was on 12 liters by nasal 

cannula, maintaining her oxygen saturation at 90%.

HEAD, EYES, EARS, NOSE AND THROAT:  Normocephalic, atraumatic.

NECK:  Supple.

HEART:  Normal first and second heart sounds, no gallop or murmur.

CHEST:  Shows central trachea, equal bilateral chest expansion, air entry, 

bilateral basal crepitation posteriorly.  I could not appreciate any rhonchi.

ABDOMEN:  Distended, soft, nontender.

NEUROLOGIC:  She is grossly intact.  She has severe deforming rheumatoid 

arthritis.



Her intake and output are incompletely recorded.



LABORATORY DATA:  Her lab work this morning showed her serum sodium 143, 

potassium 3.9, chloride 112, bicarbonate 15, anion gap of 16, BUN of 21, 

creatinine 1, estimated GFR was 64 mL per minute.  Her glucose was 51, calcium 

was 6.9.  Her C-reactive protein was 182 mg per liter.



ASSESSMENT:

1.  Acute hypoxic respiratory failure.

2.  COVID-19 pneumonia.

3.  Community-acquired pneumonia.

4.  Acute kidney injury, resolved.  Her creatinine came down from 1.8 to 1.

5.  Possible mass in the left upper lobe.

6.  Severe deforming rheumatoid arthritis.

7.  Hypertension.

8.  Hyperlipidemia.



PLAN:  To continue with IV antibiotic.  Continue with remdesivir.  Continue with

dexamethasone.  Continue with heparin for DVT prophylaxis.







AMM/JOSELIN

DR: AMM/nts   DD: 10/10/2021 10:35

DT: 10/10/2021 13:38   TID: [id number]

## 2021-10-11 VITALS — DIASTOLIC BLOOD PRESSURE: 77 MMHG | SYSTOLIC BLOOD PRESSURE: 157 MMHG

## 2021-10-11 VITALS — DIASTOLIC BLOOD PRESSURE: 74 MMHG | SYSTOLIC BLOOD PRESSURE: 152 MMHG

## 2021-10-11 VITALS — SYSTOLIC BLOOD PRESSURE: 170 MMHG | DIASTOLIC BLOOD PRESSURE: 79 MMHG

## 2021-10-11 VITALS — SYSTOLIC BLOOD PRESSURE: 171 MMHG | DIASTOLIC BLOOD PRESSURE: 90 MMHG

## 2021-10-11 VITALS — SYSTOLIC BLOOD PRESSURE: 143 MMHG | DIASTOLIC BLOOD PRESSURE: 76 MMHG

## 2021-10-11 VITALS — SYSTOLIC BLOOD PRESSURE: 154 MMHG | DIASTOLIC BLOOD PRESSURE: 81 MMHG

## 2021-10-11 VITALS — DIASTOLIC BLOOD PRESSURE: 73 MMHG | SYSTOLIC BLOOD PRESSURE: 154 MMHG

## 2021-10-11 VITALS — DIASTOLIC BLOOD PRESSURE: 76 MMHG | SYSTOLIC BLOOD PRESSURE: 151 MMHG

## 2021-10-11 VITALS — DIASTOLIC BLOOD PRESSURE: 62 MMHG | SYSTOLIC BLOOD PRESSURE: 118 MMHG

## 2021-10-11 VITALS — DIASTOLIC BLOOD PRESSURE: 91 MMHG | SYSTOLIC BLOOD PRESSURE: 162 MMHG

## 2021-10-11 VITALS — DIASTOLIC BLOOD PRESSURE: 59 MMHG | SYSTOLIC BLOOD PRESSURE: 117 MMHG

## 2021-10-11 VITALS — SYSTOLIC BLOOD PRESSURE: 142 MMHG | DIASTOLIC BLOOD PRESSURE: 77 MMHG

## 2021-10-11 VITALS — DIASTOLIC BLOOD PRESSURE: 79 MMHG | SYSTOLIC BLOOD PRESSURE: 160 MMHG

## 2021-10-11 VITALS — SYSTOLIC BLOOD PRESSURE: 140 MMHG | DIASTOLIC BLOOD PRESSURE: 77 MMHG

## 2021-10-11 VITALS — DIASTOLIC BLOOD PRESSURE: 78 MMHG | SYSTOLIC BLOOD PRESSURE: 153 MMHG

## 2021-10-11 VITALS — SYSTOLIC BLOOD PRESSURE: 164 MMHG | DIASTOLIC BLOOD PRESSURE: 79 MMHG

## 2021-10-11 VITALS — DIASTOLIC BLOOD PRESSURE: 82 MMHG | SYSTOLIC BLOOD PRESSURE: 169 MMHG

## 2021-10-11 VITALS — SYSTOLIC BLOOD PRESSURE: 160 MMHG | DIASTOLIC BLOOD PRESSURE: 87 MMHG

## 2021-10-11 VITALS — SYSTOLIC BLOOD PRESSURE: 156 MMHG | DIASTOLIC BLOOD PRESSURE: 82 MMHG

## 2021-10-11 LAB
ALBUMIN SERPL-MCNC: 2.1 G/DL (ref 3.4–5)
ALBUMIN/GLOB SERPL: 0.5 {RATIO} (ref 1–1.7)
ALP SERPL-CCNC: 67 U/L (ref 46–116)
ALT SERPL-CCNC: 27 U/L (ref 14–59)
ANION GAP SERPL CALC-SCNC: 13 MMOL/L (ref 6–14)
AST SERPL-CCNC: 55 U/L (ref 15–37)
BILIRUB SERPL-MCNC: 0.1 MG/DL (ref 0.2–1)
BUN/CREAT SERPL: 21 (ref 6–20)
CA-I SERPL ISE-MCNC: 25 MG/DL (ref 7–20)
CALCIUM SERPL-MCNC: 7.6 MG/DL (ref 8.5–10.1)
CHLORIDE SERPL-SCNC: 115 MMOL/L (ref 98–107)
CO2 SERPL-SCNC: 20 MMOL/L (ref 21–32)
CREAT SERPL-MCNC: 1.2 MG/DL (ref 0.6–1)
ERYTHROCYTE [DISTWIDTH] IN BLOOD BY AUTOMATED COUNT: 16.2 % (ref 11.5–14.5)
GFR SERPLBLD BASED ON 1.73 SQ M-ARVRAT: 51.8 ML/MIN
GLOBULIN SER-MCNC: 4 G/DL (ref 2.2–3.8)
GLUCOSE SERPL-MCNC: 153 MG/DL (ref 70–99)
HCT VFR BLD CALC: 31.6 % (ref 36–47)
HGB BLD-MCNC: 10.1 G/DL (ref 12–15.5)
MCH RBC QN AUTO: 31 PG (ref 25–35)
MCHC RBC AUTO-ENTMCNC: 32 G/DL (ref 31–37)
MCV RBC AUTO: 96 FL (ref 79–100)
PLATELET # BLD AUTO: 334 X10^3/UL (ref 140–400)
POTASSIUM SERPL-SCNC: 4.1 MMOL/L (ref 3.5–5.1)
PROT SERPL-MCNC: 6.1 G/DL (ref 6.4–8.2)
RBC # BLD AUTO: 3.31 X10^6/UL (ref 3.5–5.4)
SODIUM SERPL-SCNC: 148 MMOL/L (ref 136–145)
WBC # BLD AUTO: 6.6 X10^3/UL (ref 4–11)

## 2021-10-11 RX ADMIN — ACETAMINOPHEN PRN MG: 325 TABLET, FILM COATED ORAL at 20:52

## 2021-10-11 RX ADMIN — Medication SCH MG: at 20:51

## 2021-10-11 RX ADMIN — MORPHINE SULFATE PRN MG: 4 INJECTION, SOLUTION INTRAMUSCULAR; INTRAVENOUS at 20:53

## 2021-10-11 RX ADMIN — BUDESONIDE SCH MG: 0.5 SUSPENSION RESPIRATORY (INHALATION) at 20:52

## 2021-10-11 RX ADMIN — MORPHINE SULFATE PRN MG: 4 INJECTION, SOLUTION INTRAMUSCULAR; INTRAVENOUS at 02:37

## 2021-10-11 RX ADMIN — BUDESONIDE SCH MG: 0.5 SUSPENSION RESPIRATORY (INHALATION) at 09:31

## 2021-10-11 RX ADMIN — Medication SCH EA: at 20:52

## 2021-10-11 RX ADMIN — Medication SCH MG: at 16:06

## 2021-10-11 RX ADMIN — ZINC SULFATE CAP 220 MG (50 MG ELEMENTAL ZN) SCH MG: 220 (50 ZN) CAP at 16:03

## 2021-10-11 RX ADMIN — MORPHINE SULFATE PRN MG: 4 INJECTION, SOLUTION INTRAMUSCULAR; INTRAVENOUS at 12:08

## 2021-10-11 RX ADMIN — Medication SCH CAP: at 20:51

## 2021-10-11 RX ADMIN — ENOXAPARIN SODIUM SCH MG: 100 INJECTION SUBCUTANEOUS at 09:33

## 2021-10-11 RX ADMIN — DEXAMETHASONE SODIUM PHOSPHATE SCH MG: 10 INJECTION INTRAMUSCULAR; INTRAVENOUS at 20:52

## 2021-10-11 RX ADMIN — PANTOPRAZOLE SODIUM SCH MG: 40 TABLET, DELAYED RELEASE ORAL at 20:51

## 2021-10-11 RX ADMIN — ONDANSETRON PRN MG: 2 INJECTION INTRAMUSCULAR; INTRAVENOUS at 18:42

## 2021-10-11 RX ADMIN — LIDOCAINE SCH PATCH: 50 PATCH CUTANEOUS at 09:00

## 2021-10-11 RX ADMIN — AZITHROMYCIN SCH MG: 250 TABLET, FILM COATED ORAL at 09:31

## 2021-10-11 RX ADMIN — ONDANSETRON PRN MG: 2 INJECTION INTRAMUSCULAR; INTRAVENOUS at 12:07

## 2021-10-11 RX ADMIN — Medication SCH CAP: at 09:31

## 2021-10-11 RX ADMIN — ACETAMINOPHEN PRN MG: 325 TABLET, FILM COATED ORAL at 09:31

## 2021-10-11 RX ADMIN — REMDESIVIR SCH MLS/HR: 100 INJECTION, POWDER, LYOPHILIZED, FOR SOLUTION INTRAVENOUS at 16:06

## 2021-10-11 NOTE — RAD
Examination: CT angiography chest with IV contrast



HISTORY: History of Covid positive, shortness of breath



COMPARISON: None available



TECHNIQUE: Axial CT angiographic images of chest were performed with IV contrast. Coronal and sagitta
l 3-D MIP reformats are performed.



 Exposure: One or more of the following individualized dose reduction techniques were utilized for th
is examination:  1. Automated exposure control  2. Adjustment of the mA and/or kV according to patien
t size  3. Use of iterative reconstruction technique



FINDINGS: Mild cardiomegaly. The ascending aorta measures 3.8 cm in transverse dimension. Coronary ar
sherron calcifications identified. There is no evidence of filling defect identified in the main pulmona
ry arterial trunk and right and left main pulmonary arteries and the visualized lobar, segmental bran
ch of the pulmonary arteries. Large consolidation changes identified in the bilateral lungs throughou
t with air bronchograms. Mild degree attenuation within the liver likely hepatic steatosis. The splee
n, adrenals grossly appears unremarkable. Cystic structure identified in the right kidney measuring 1
.7 cm likely cyst.



Moderate degenerative changes thoracic spine



IMPRESSION:



1.  No evidence of pulmonary embolism.

2.  Large consolidation changes identified in the bilateral lungs throughout with air bronchograms li
amanda pneumonia or covid pneumonia. Follow-up to resolution.

3.  Coronary artery calcifications.



Electronically signed by: Saul Hua MD (10/11/2021 9:21 AM) [id number]

## 2021-10-12 VITALS — SYSTOLIC BLOOD PRESSURE: 154 MMHG | DIASTOLIC BLOOD PRESSURE: 78 MMHG

## 2021-10-12 VITALS — DIASTOLIC BLOOD PRESSURE: 68 MMHG | SYSTOLIC BLOOD PRESSURE: 130 MMHG

## 2021-10-12 VITALS — DIASTOLIC BLOOD PRESSURE: 85 MMHG | SYSTOLIC BLOOD PRESSURE: 160 MMHG

## 2021-10-12 VITALS — DIASTOLIC BLOOD PRESSURE: 80 MMHG | SYSTOLIC BLOOD PRESSURE: 164 MMHG

## 2021-10-12 VITALS — SYSTOLIC BLOOD PRESSURE: 152 MMHG | DIASTOLIC BLOOD PRESSURE: 77 MMHG

## 2021-10-12 VITALS — DIASTOLIC BLOOD PRESSURE: 80 MMHG | SYSTOLIC BLOOD PRESSURE: 166 MMHG

## 2021-10-12 VITALS — SYSTOLIC BLOOD PRESSURE: 162 MMHG | DIASTOLIC BLOOD PRESSURE: 81 MMHG

## 2021-10-12 VITALS — SYSTOLIC BLOOD PRESSURE: 167 MMHG | DIASTOLIC BLOOD PRESSURE: 87 MMHG

## 2021-10-12 VITALS — DIASTOLIC BLOOD PRESSURE: 86 MMHG | SYSTOLIC BLOOD PRESSURE: 164 MMHG

## 2021-10-12 VITALS — SYSTOLIC BLOOD PRESSURE: 133 MMHG | DIASTOLIC BLOOD PRESSURE: 61 MMHG

## 2021-10-12 VITALS — DIASTOLIC BLOOD PRESSURE: 79 MMHG | SYSTOLIC BLOOD PRESSURE: 150 MMHG

## 2021-10-12 VITALS — SYSTOLIC BLOOD PRESSURE: 135 MMHG | DIASTOLIC BLOOD PRESSURE: 68 MMHG

## 2021-10-12 PROCEDURE — 5A0935A ASSISTANCE WITH RESPIRATORY VENTILATION, LESS THAN 24 CONSECUTIVE HOURS, HIGH NASAL FLOW/VELOCITY: ICD-10-PCS | Performed by: INTERNAL MEDICINE

## 2021-10-12 RX ADMIN — Medication SCH CAP: at 20:36

## 2021-10-12 RX ADMIN — DEXAMETHASONE SODIUM PHOSPHATE SCH MG: 10 INJECTION INTRAMUSCULAR; INTRAVENOUS at 20:36

## 2021-10-12 RX ADMIN — MORPHINE SULFATE PRN MG: 4 INJECTION, SOLUTION INTRAMUSCULAR; INTRAVENOUS at 21:33

## 2021-10-12 RX ADMIN — ENOXAPARIN SODIUM SCH MG: 100 INJECTION SUBCUTANEOUS at 10:24

## 2021-10-12 RX ADMIN — Medication SCH MG: at 20:36

## 2021-10-12 RX ADMIN — PANTOPRAZOLE SODIUM SCH MG: 40 TABLET, DELAYED RELEASE ORAL at 10:22

## 2021-10-12 RX ADMIN — LIDOCAINE SCH PATCH: 50 PATCH CUTANEOUS at 10:23

## 2021-10-12 RX ADMIN — MORPHINE SULFATE PRN MG: 4 INJECTION, SOLUTION INTRAMUSCULAR; INTRAVENOUS at 17:37

## 2021-10-12 RX ADMIN — ACETAMINOPHEN PRN MG: 325 TABLET, FILM COATED ORAL at 20:35

## 2021-10-12 RX ADMIN — MORPHINE SULFATE PRN MG: 4 INJECTION, SOLUTION INTRAMUSCULAR; INTRAVENOUS at 02:18

## 2021-10-12 RX ADMIN — BUDESONIDE SCH MG: 0.5 SUSPENSION RESPIRATORY (INHALATION) at 20:36

## 2021-10-12 RX ADMIN — ZINC SULFATE CAP 220 MG (50 MG ELEMENTAL ZN) SCH MG: 220 (50 ZN) CAP at 10:22

## 2021-10-12 RX ADMIN — BUDESONIDE SCH MG: 0.5 SUSPENSION RESPIRATORY (INHALATION) at 10:24

## 2021-10-12 RX ADMIN — Medication SCH MG: at 10:25

## 2021-10-12 RX ADMIN — AZITHROMYCIN SCH MG: 250 TABLET, FILM COATED ORAL at 10:23

## 2021-10-12 RX ADMIN — REMDESIVIR SCH MLS/HR: 100 INJECTION, POWDER, LYOPHILIZED, FOR SOLUTION INTRAVENOUS at 12:00

## 2021-10-12 RX ADMIN — ONDANSETRON PRN MG: 2 INJECTION INTRAMUSCULAR; INTRAVENOUS at 20:36

## 2021-10-12 RX ADMIN — ONDANSETRON PRN MG: 2 INJECTION INTRAMUSCULAR; INTRAVENOUS at 02:18

## 2021-10-12 RX ADMIN — Medication SCH CAP: at 10:23

## 2021-10-12 RX ADMIN — Medication SCH EA: at 20:35

## 2021-10-12 NOTE — PN
DATE: 10/11/2021

SUBJECTIVE:  The patient is resting, propped up in bed, clearly tachypneic, 

complaining of chest pain, mostly in the right side superiorly.  She is now on 

15 liters by nonrebreather mask, maintaining her oxygen saturation at 99%.  She 

had had a CT angio of the chest, which basically showed no evidence of pulmonary

embolism, large consolidation changes identified in the bilateral lungs 

throughout with air bronchograms, likely pneumonia or COVID pneumonia.  She has 

also coronary artery calcification.



PHYSICAL EXAMINATION:

GENERAL:  When I examined her, she was pale, but no jaundice, cyanosis or 

thyromegaly.  No jugular venous distention.  No limb edema.

VITAL SIGNS:  Her heart rate was 78, blood pressure is 117/59, temperature was 

98.2, respiratory rate was 27 and oxygen saturation was 99% on 15 liters by 

nonrebreather mask.

HEAD, EYES, EARS, NOSE, AND THROAT:  Showed normocephalic, atraumatic.

NECK:  Supple.

HEART:  Showed normal first and second heart sounds, no gallop or murmur.

CHEST:  Shows central trachea, equally reduced expansion, reduced air entry, 

vesicular breath sounds with bilateral scattered rhonchi and bilateral basal 

crepitation posteriorly.

ABDOMEN:  Distended, soft, nontender.

NEUROLOGIC:  She is awake, alert, responding appropriately.  All her cranial 

nerves intact.  She moves extremities without difficulty.  Has severe deforming 

rheumatoid arthritis.



Her intake was 480, no output was recorded.



LABORATORY DATA:  As of this morning, her white cell count was 6600, hemoglobin 

10, hematocrit 32, MCV 96 and platelet count of 334,000.  Her chemistry showed a

serum sodium 148, potassium 4.1, chloride 115, bicarbonate 20, anion gap of 13, 

BUN 25, creatinine 1.2.  Estimated GFR was 52 mL per minute.  Her glucose 153, 

calcium was 7.6.  Total bilirubin, AST, ALT, alkaline phosphatase were normal.  

Total protein 6.1, albumin was 2.1.



ASSESSMENT:

1.  Acute hypoxic respiratory failure.

2.  COVID-19 pneumonia.

3.  Questionable community-acquired pneumonia.

4.  Severe immunosuppression as the patient has been treated with Enbrel only 

recently her first dose of Enbrel was on 09/28 and the second one was on 10/05. 

It was put on hold by her rheumatologist.

5.  Acute kidney injury, resolved.  Her creatinine is down from 1.8 to 1.

6.  Severe deforming rheumatoid arthritis.

7.  Hypertension.

8.  Hyperlipidemia.



PLAN:  To continue with IV antibiotic.  Continue with remdesivir and steroids.  

Continue with DVT prophylaxis as she has no evidence of pulmonary embolism.  I 

cut down her Lovenox to only once a day.  The patient is code status is DNR/DNI 

and overall prognosis is extremely poor.







AMM/SAMANTHA

DR: AMM/nts   DD: 10/11/2021 11:42

DT: 10/12/2021 00:40   TID: [id number]

## 2021-10-13 VITALS — SYSTOLIC BLOOD PRESSURE: 136 MMHG | DIASTOLIC BLOOD PRESSURE: 65 MMHG

## 2021-10-13 VITALS — SYSTOLIC BLOOD PRESSURE: 157 MMHG | DIASTOLIC BLOOD PRESSURE: 81 MMHG

## 2021-10-13 VITALS — DIASTOLIC BLOOD PRESSURE: 73 MMHG | SYSTOLIC BLOOD PRESSURE: 146 MMHG

## 2021-10-13 VITALS — SYSTOLIC BLOOD PRESSURE: 148 MMHG | DIASTOLIC BLOOD PRESSURE: 77 MMHG

## 2021-10-13 VITALS — SYSTOLIC BLOOD PRESSURE: 149 MMHG | DIASTOLIC BLOOD PRESSURE: 75 MMHG

## 2021-10-13 VITALS — SYSTOLIC BLOOD PRESSURE: 143 MMHG | DIASTOLIC BLOOD PRESSURE: 89 MMHG

## 2021-10-13 VITALS — DIASTOLIC BLOOD PRESSURE: 67 MMHG | SYSTOLIC BLOOD PRESSURE: 129 MMHG

## 2021-10-13 VITALS — DIASTOLIC BLOOD PRESSURE: 75 MMHG | SYSTOLIC BLOOD PRESSURE: 150 MMHG

## 2021-10-13 VITALS — DIASTOLIC BLOOD PRESSURE: 75 MMHG | SYSTOLIC BLOOD PRESSURE: 152 MMHG

## 2021-10-13 VITALS — DIASTOLIC BLOOD PRESSURE: 66 MMHG | SYSTOLIC BLOOD PRESSURE: 131 MMHG

## 2021-10-13 VITALS — DIASTOLIC BLOOD PRESSURE: 76 MMHG | SYSTOLIC BLOOD PRESSURE: 139 MMHG

## 2021-10-13 VITALS — SYSTOLIC BLOOD PRESSURE: 150 MMHG | DIASTOLIC BLOOD PRESSURE: 79 MMHG

## 2021-10-13 PROCEDURE — 5A0935A ASSISTANCE WITH RESPIRATORY VENTILATION, LESS THAN 24 CONSECUTIVE HOURS, HIGH NASAL FLOW/VELOCITY: ICD-10-PCS | Performed by: INTERNAL MEDICINE

## 2021-10-13 RX ADMIN — LIDOCAINE SCH PATCH: 50 PATCH CUTANEOUS at 09:02

## 2021-10-13 RX ADMIN — Medication SCH EA: at 20:57

## 2021-10-13 RX ADMIN — Medication SCH MG: at 09:04

## 2021-10-13 RX ADMIN — MORPHINE SULFATE PRN MG: 2 INJECTION, SOLUTION INTRAMUSCULAR; INTRAVENOUS at 20:06

## 2021-10-13 RX ADMIN — Medication SCH MG: at 20:07

## 2021-10-13 RX ADMIN — Medication SCH CAP: at 20:07

## 2021-10-13 RX ADMIN — BUDESONIDE SCH MG: 0.5 SUSPENSION RESPIRATORY (INHALATION) at 11:00

## 2021-10-13 RX ADMIN — ZINC SULFATE CAP 220 MG (50 MG ELEMENTAL ZN) SCH MG: 220 (50 ZN) CAP at 09:03

## 2021-10-13 RX ADMIN — PANTOPRAZOLE SODIUM SCH MG: 40 TABLET, DELAYED RELEASE ORAL at 07:30

## 2021-10-13 RX ADMIN — Medication SCH MG: at 09:00

## 2021-10-13 RX ADMIN — Medication SCH CAP: at 09:03

## 2021-10-13 RX ADMIN — MORPHINE SULFATE PRN MG: 4 INJECTION, SOLUTION INTRAMUSCULAR; INTRAVENOUS at 05:40

## 2021-10-13 RX ADMIN — REMDESIVIR SCH MLS/HR: 100 INJECTION, POWDER, LYOPHILIZED, FOR SOLUTION INTRAVENOUS at 13:46

## 2021-10-13 RX ADMIN — DEXAMETHASONE SODIUM PHOSPHATE SCH MG: 10 INJECTION INTRAMUSCULAR; INTRAVENOUS at 20:06

## 2021-10-13 RX ADMIN — Medication SCH CAP: at 09:00

## 2021-10-13 RX ADMIN — ZINC SULFATE CAP 220 MG (50 MG ELEMENTAL ZN) SCH MG: 220 (50 ZN) CAP at 09:00

## 2021-10-13 RX ADMIN — PANTOPRAZOLE SODIUM SCH MG: 40 TABLET, DELAYED RELEASE ORAL at 09:03

## 2021-10-13 RX ADMIN — ENOXAPARIN SODIUM SCH MG: 100 INJECTION SUBCUTANEOUS at 09:02

## 2021-10-13 RX ADMIN — MORPHINE SULFATE PRN MG: 4 INJECTION, SOLUTION INTRAMUSCULAR; INTRAVENOUS at 13:52

## 2021-10-13 RX ADMIN — BUDESONIDE SCH MG: 0.5 SUSPENSION RESPIRATORY (INHALATION) at 20:06

## 2021-10-13 RX ADMIN — AZITHROMYCIN SCH MG: 250 TABLET, FILM COATED ORAL at 09:02

## 2021-10-13 NOTE — PN
DATE: 10/13/2021

ATTENDING PHYSICIAN:  Dr. Hicks.



SUBJECTIVE:  The patient is dyspneic with minimal exertion.  She has vague 

symptoms.  She is pleasantly confused.



OBJECTIVE FINDINGS:

VITAL SIGNS:  Blood pressure this morning is 150/79, pulse 110 and regular, 

oxygen sat 94% on 13 L by nonrebreathing mask.  She is afebrile.

HEENT:  Head is without trauma.  Pupils are reactive.  Sclerae are nonicteric.  

Oropharynx is clear.

NECK:  Supple, no bruits.

LUNGS:  Coarse rhonchi bilaterally.

CARDIOVASCULAR:  Regular heart tones.  No gallop.

ABDOMEN:  Soft.

EXTREMITIES:  Show severe deformities with ulnar deviation and Boutonniere 

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



IMAGING STUDIES:  Reviewed.



LABORATORY STUDIES:  Her hemoglobin this morning was 10.1 g/dL, white count 

6600.  Electrolytes:  Creatinine is 1.2 mg/dL.



ASSESSMENT:

1.  This 86-year-old female has acute-on-chronic respiratory failure.

2.  Bilateral COVID pneumonia.

3.  Severe immunosuppression for rheumatoid arthritis.

4.  Acute kidney injury.

5.  Hypertension.

6.  Hyperlipidemia.



PLAN:

1.  Continue antibiotics as ordered.

2.  Day #4 of remdesivir.

3.  We are trying to wean down supplemental oxygen, but she is still requiring 

quite a bit to maintain saturations.

4.  Prognosis is quite limited with her underlying immunocompromised and 

generalized debilitation.  She is a DNR per advanced directive.  Unfortunately, 

no family is available here at this time.







TANO

DR: Shaye   DD: 10/13/2021 10:52

DT: 10/13/2021 15:02   TID: [id number]

## 2021-10-14 VITALS — DIASTOLIC BLOOD PRESSURE: 76 MMHG | SYSTOLIC BLOOD PRESSURE: 143 MMHG

## 2021-10-14 VITALS — SYSTOLIC BLOOD PRESSURE: 154 MMHG | DIASTOLIC BLOOD PRESSURE: 84 MMHG

## 2021-10-14 VITALS — SYSTOLIC BLOOD PRESSURE: 134 MMHG | DIASTOLIC BLOOD PRESSURE: 64 MMHG

## 2021-10-14 VITALS — SYSTOLIC BLOOD PRESSURE: 118 MMHG | DIASTOLIC BLOOD PRESSURE: 51 MMHG

## 2021-10-14 VITALS — DIASTOLIC BLOOD PRESSURE: 76 MMHG | SYSTOLIC BLOOD PRESSURE: 149 MMHG

## 2021-10-14 VITALS — DIASTOLIC BLOOD PRESSURE: 71 MMHG | SYSTOLIC BLOOD PRESSURE: 139 MMHG

## 2021-10-14 VITALS — DIASTOLIC BLOOD PRESSURE: 82 MMHG | SYSTOLIC BLOOD PRESSURE: 157 MMHG

## 2021-10-14 VITALS — DIASTOLIC BLOOD PRESSURE: 70 MMHG | SYSTOLIC BLOOD PRESSURE: 144 MMHG

## 2021-10-14 VITALS — DIASTOLIC BLOOD PRESSURE: 68 MMHG | SYSTOLIC BLOOD PRESSURE: 121 MMHG

## 2021-10-14 VITALS — SYSTOLIC BLOOD PRESSURE: 166 MMHG | DIASTOLIC BLOOD PRESSURE: 75 MMHG

## 2021-10-14 VITALS — DIASTOLIC BLOOD PRESSURE: 66 MMHG | SYSTOLIC BLOOD PRESSURE: 148 MMHG

## 2021-10-14 VITALS — DIASTOLIC BLOOD PRESSURE: 68 MMHG | SYSTOLIC BLOOD PRESSURE: 131 MMHG

## 2021-10-14 VITALS — SYSTOLIC BLOOD PRESSURE: 124 MMHG | DIASTOLIC BLOOD PRESSURE: 60 MMHG

## 2021-10-14 VITALS — DIASTOLIC BLOOD PRESSURE: 68 MMHG | SYSTOLIC BLOOD PRESSURE: 145 MMHG

## 2021-10-14 VITALS — DIASTOLIC BLOOD PRESSURE: 63 MMHG | SYSTOLIC BLOOD PRESSURE: 143 MMHG

## 2021-10-14 VITALS — DIASTOLIC BLOOD PRESSURE: 80 MMHG | SYSTOLIC BLOOD PRESSURE: 147 MMHG

## 2021-10-14 VITALS — SYSTOLIC BLOOD PRESSURE: 153 MMHG | DIASTOLIC BLOOD PRESSURE: 78 MMHG

## 2021-10-14 VITALS — SYSTOLIC BLOOD PRESSURE: 158 MMHG | DIASTOLIC BLOOD PRESSURE: 79 MMHG

## 2021-10-14 VITALS — DIASTOLIC BLOOD PRESSURE: 75 MMHG | SYSTOLIC BLOOD PRESSURE: 148 MMHG

## 2021-10-14 VITALS — SYSTOLIC BLOOD PRESSURE: 153 MMHG | DIASTOLIC BLOOD PRESSURE: 76 MMHG

## 2021-10-14 VITALS — DIASTOLIC BLOOD PRESSURE: 73 MMHG | SYSTOLIC BLOOD PRESSURE: 138 MMHG

## 2021-10-14 PROCEDURE — 5A0935A ASSISTANCE WITH RESPIRATORY VENTILATION, LESS THAN 24 CONSECUTIVE HOURS, HIGH NASAL FLOW/VELOCITY: ICD-10-PCS | Performed by: INTERNAL MEDICINE

## 2021-10-14 RX ADMIN — MORPHINE SULFATE PRN MG: 2 INJECTION, SOLUTION INTRAMUSCULAR; INTRAVENOUS at 21:00

## 2021-10-14 RX ADMIN — Medication SCH MG: at 21:00

## 2021-10-14 RX ADMIN — ENOXAPARIN SODIUM SCH MG: 100 INJECTION SUBCUTANEOUS at 10:13

## 2021-10-14 RX ADMIN — MORPHINE SULFATE PRN MG: 2 INJECTION, SOLUTION INTRAMUSCULAR; INTRAVENOUS at 07:39

## 2021-10-14 RX ADMIN — DEXAMETHASONE SODIUM PHOSPHATE SCH MG: 10 INJECTION INTRAMUSCULAR; INTRAVENOUS at 21:00

## 2021-10-14 RX ADMIN — Medication SCH MG: at 10:19

## 2021-10-14 RX ADMIN — Medication SCH CAP: at 10:17

## 2021-10-14 RX ADMIN — Medication SCH CAP: at 09:00

## 2021-10-14 RX ADMIN — Medication SCH MG: at 09:00

## 2021-10-14 RX ADMIN — MORPHINE SULFATE PRN MG: 2 INJECTION, SOLUTION INTRAMUSCULAR; INTRAVENOUS at 12:15

## 2021-10-14 RX ADMIN — PANTOPRAZOLE SODIUM SCH MG: 40 TABLET, DELAYED RELEASE ORAL at 07:37

## 2021-10-14 RX ADMIN — MORPHINE SULFATE PRN MG: 2 INJECTION, SOLUTION INTRAMUSCULAR; INTRAVENOUS at 10:16

## 2021-10-14 RX ADMIN — MORPHINE SULFATE PRN MG: 2 INJECTION, SOLUTION INTRAMUSCULAR; INTRAVENOUS at 11:11

## 2021-10-14 RX ADMIN — BUDESONIDE SCH MG: 0.5 SUSPENSION RESPIRATORY (INHALATION) at 20:59

## 2021-10-14 RX ADMIN — Medication SCH CAP: at 21:00

## 2021-10-14 RX ADMIN — ZINC SULFATE CAP 220 MG (50 MG ELEMENTAL ZN) SCH MG: 220 (50 ZN) CAP at 10:17

## 2021-10-14 RX ADMIN — Medication SCH EA: at 21:00

## 2021-10-14 RX ADMIN — AZITHROMYCIN SCH MG: 250 TABLET, FILM COATED ORAL at 10:16

## 2021-10-14 RX ADMIN — BUDESONIDE SCH MG: 0.5 SUSPENSION RESPIRATORY (INHALATION) at 08:00

## 2021-10-14 RX ADMIN — ZINC SULFATE CAP 220 MG (50 MG ELEMENTAL ZN) SCH MG: 220 (50 ZN) CAP at 09:00

## 2021-10-14 RX ADMIN — LIDOCAINE SCH PATCH: 50 PATCH CUTANEOUS at 10:10

## 2021-10-14 RX ADMIN — MORPHINE SULFATE PRN MG: 2 INJECTION, SOLUTION INTRAMUSCULAR; INTRAVENOUS at 17:12

## 2021-10-14 NOTE — PN
DATE: 10/14/2021

ATTENDING PHYSICIAN:  Dr. Hicks.



SUBJECTIVE:  The patient remains pleasantly confused.  She is still dyspneic 

with minimal exertion.



OBJECTIVE FINDINGS:

VITAL SIGNS:  Oxygen saturations are barely 91% on 15 liters of high-flow nasal 

cannula, pulse is 107 and regular, blood pressure 153/76.  She is afebrile.

HEENT:  Head is without trauma.  Pupils are reactive.  Sclerae is nonicteric.  

The oropharynx is clear.

NECK:  Supple.

LUNGS:  Shallow respirations with minimal rhonchi.

CARDIOVASCULAR:  Regular heart tones.  No gallop.

ABDOMEN:  Soft.

EXTREMITIES:  Show the ravages of rheumatoid arthritis.  There is no swelling.

NEUROLOGIC:  Pleasantly confused.  Gerard catheter remains intact.



ASSESSMENT:

1.  An 86-year-old female with acute on chronic respiratory failure.

2.  Bilateral COVID pneumonia.

3.  Severe immunosuppression for rheumatoid arthritis.

4.  Acute kidney injury.

5.  Hypertension.

6.  Hyperlipidemia.

7.  Delirium related to illness.



PLAN:

1.  Continue comfort measures.

2.  Remdesivir.  Course is finished.

3.  Continue intravenous antibiotics.

4.  Morphine dose increased for air hunger.



Prognosis remains poor.  She will be petros to survive this.  Once again, she has

a very poor prognosis.







JESSENIA/ALLEN

DR: JESSENIA/nancy   DD: 10/14/2021 09:04

DT: 10/14/2021 11:52   TID: [id number]

## 2021-10-15 ENCOUNTER — HOSPITAL ENCOUNTER (INPATIENT)
Dept: HOSPITAL 63 - ICU | Age: 86
LOS: 2 days | DRG: 189 | End: 2021-10-17
Attending: HOSPITALIST | Admitting: HOSPITALIST
Payer: COMMERCIAL

## 2021-10-15 VITALS — SYSTOLIC BLOOD PRESSURE: 144 MMHG | DIASTOLIC BLOOD PRESSURE: 69 MMHG

## 2021-10-15 VITALS — SYSTOLIC BLOOD PRESSURE: 123 MMHG | DIASTOLIC BLOOD PRESSURE: 65 MMHG

## 2021-10-15 VITALS — HEIGHT: 61 IN | BODY MASS INDEX: 19.11 KG/M2 | WEIGHT: 101.19 LBS

## 2021-10-15 VITALS — SYSTOLIC BLOOD PRESSURE: 143 MMHG | DIASTOLIC BLOOD PRESSURE: 77 MMHG

## 2021-10-15 VITALS — SYSTOLIC BLOOD PRESSURE: 143 MMHG | DIASTOLIC BLOOD PRESSURE: 72 MMHG

## 2021-10-15 VITALS — DIASTOLIC BLOOD PRESSURE: 82 MMHG | SYSTOLIC BLOOD PRESSURE: 167 MMHG

## 2021-10-15 VITALS — SYSTOLIC BLOOD PRESSURE: 108 MMHG | DIASTOLIC BLOOD PRESSURE: 56 MMHG

## 2021-10-15 VITALS — SYSTOLIC BLOOD PRESSURE: 149 MMHG | DIASTOLIC BLOOD PRESSURE: 78 MMHG

## 2021-10-15 VITALS — SYSTOLIC BLOOD PRESSURE: 148 MMHG | DIASTOLIC BLOOD PRESSURE: 82 MMHG

## 2021-10-15 VITALS — DIASTOLIC BLOOD PRESSURE: 70 MMHG | SYSTOLIC BLOOD PRESSURE: 149 MMHG

## 2021-10-15 VITALS — DIASTOLIC BLOOD PRESSURE: 62 MMHG | SYSTOLIC BLOOD PRESSURE: 112 MMHG

## 2021-10-15 VITALS — DIASTOLIC BLOOD PRESSURE: 75 MMHG | SYSTOLIC BLOOD PRESSURE: 140 MMHG

## 2021-10-15 VITALS — SYSTOLIC BLOOD PRESSURE: 126 MMHG | DIASTOLIC BLOOD PRESSURE: 71 MMHG

## 2021-10-15 VITALS — DIASTOLIC BLOOD PRESSURE: 62 MMHG | SYSTOLIC BLOOD PRESSURE: 111 MMHG

## 2021-10-15 VITALS — SYSTOLIC BLOOD PRESSURE: 157 MMHG | DIASTOLIC BLOOD PRESSURE: 86 MMHG

## 2021-10-15 VITALS — SYSTOLIC BLOOD PRESSURE: 156 MMHG | DIASTOLIC BLOOD PRESSURE: 85 MMHG

## 2021-10-15 VITALS — SYSTOLIC BLOOD PRESSURE: 121 MMHG | DIASTOLIC BLOOD PRESSURE: 65 MMHG

## 2021-10-15 VITALS — SYSTOLIC BLOOD PRESSURE: 124 MMHG | DIASTOLIC BLOOD PRESSURE: 64 MMHG

## 2021-10-15 VITALS — DIASTOLIC BLOOD PRESSURE: 67 MMHG | SYSTOLIC BLOOD PRESSURE: 120 MMHG

## 2021-10-15 DIAGNOSIS — U07.1: ICD-10-CM

## 2021-10-15 DIAGNOSIS — R40.20: ICD-10-CM

## 2021-10-15 DIAGNOSIS — J12.82: ICD-10-CM

## 2021-10-15 DIAGNOSIS — M06.9: ICD-10-CM

## 2021-10-15 DIAGNOSIS — Z66: ICD-10-CM

## 2021-10-15 DIAGNOSIS — D84.9: ICD-10-CM

## 2021-10-15 DIAGNOSIS — Z51.5: ICD-10-CM

## 2021-10-15 DIAGNOSIS — J96.01: Primary | ICD-10-CM

## 2021-10-15 PROCEDURE — 5A0935A ASSISTANCE WITH RESPIRATORY VENTILATION, LESS THAN 24 CONSECUTIVE HOURS, HIGH NASAL FLOW/VELOCITY: ICD-10-PCS | Performed by: INTERNAL MEDICINE

## 2021-10-15 PROCEDURE — 5A0935A ASSISTANCE WITH RESPIRATORY VENTILATION, LESS THAN 24 CONSECUTIVE HOURS, HIGH NASAL FLOW/VELOCITY: ICD-10-PCS | Performed by: HOSPITALIST

## 2021-10-15 RX ADMIN — MORPHINE SULFATE PRN MG: 2 INJECTION, SOLUTION INTRAMUSCULAR; INTRAVENOUS at 06:29

## 2021-10-15 RX ADMIN — ENOXAPARIN SODIUM SCH MG: 100 INJECTION SUBCUTANEOUS at 08:50

## 2021-10-15 RX ADMIN — MORPHINE SULFATE PRN MLS/HR: 1 INJECTION INTRAVENOUS at 18:08

## 2021-10-15 RX ADMIN — Medication SCH CAP: at 08:48

## 2021-10-15 RX ADMIN — MORPHINE SULFATE PRN MG: 2 INJECTION, SOLUTION INTRAMUSCULAR; INTRAVENOUS at 08:51

## 2021-10-15 RX ADMIN — MORPHINE SULFATE PRN MG: 2 INJECTION, SOLUTION INTRAMUSCULAR; INTRAVENOUS at 13:03

## 2021-10-15 RX ADMIN — Medication SCH MG: at 08:49

## 2021-10-15 RX ADMIN — AZITHROMYCIN SCH MG: 250 TABLET, FILM COATED ORAL at 08:48

## 2021-10-15 RX ADMIN — ZINC SULFATE CAP 220 MG (50 MG ELEMENTAL ZN) SCH MG: 220 (50 ZN) CAP at 08:48

## 2021-10-15 RX ADMIN — LIDOCAINE SCH PATCH: 50 PATCH CUTANEOUS at 08:48

## 2021-10-15 RX ADMIN — PANTOPRAZOLE SODIUM SCH MG: 40 TABLET, DELAYED RELEASE ORAL at 08:50

## 2021-10-15 RX ADMIN — MORPHINE SULFATE PRN MLS/HR: 1 INJECTION INTRAVENOUS at 18:45

## 2021-10-15 RX ADMIN — BUDESONIDE SCH MG: 0.5 SUSPENSION RESPIRATORY (INHALATION) at 08:48

## 2021-10-15 RX ADMIN — MORPHINE SULFATE PRN MLS/HR: 1 INJECTION INTRAVENOUS at 16:33

## 2021-10-15 NOTE — DS
DATE OF DISCHARGE: 10/15/2021

ATTENDING PHYSICIAN:  Dr. Hicks.



FINAL DISCHARGE DIAGNOSES:

1.  COVID-19 pneumonia, bilateral.

2.  Acute respiratory failure with hypoxemia.

3.  Generalized debilitation.

4.  Severe immunosuppression.

5.  Rheumatoid arthritis.

6.  Acute kidney injury.

7.  Hypertension.

8.  Delirium related to illness.



HISTORY AND PHYSICAL:  The patient is an unfortunate 86-year-old female.  She 

had coronavirus and was admitted with respiratory failure requiring high doses 

of supplemental oxygen.



PHYSICAL EXAMINATION:  Please see the dictated note.



PERTINENT LABORATORY AND X-RAY STUDIES:  Admission hemoglobin was 10.9 g/dL, 

repeated was 10.1 g; white count 6600.  Chemistry panel:  Sodium 142 mEq/L, 

potassium 3.9, creatinine was 1.3 mg/dL.  Transaminases within range.  Troponin 

was nonischemic.



IMAGING STUDIES:  Chest CT and head CT were done.  The reports are on the 

database.  There is no evidence of pulmonary embolus.  There are large 

consolidative changes identified in bilateral lungs throughout and air 

bronchogram with pneumonia consistent with COVID pneumonia.



COURSE IN THE HOSPITAL:  The patient was treated accordingly.  She was given 

supplemental oxygen with eventually increased to 15 liters to maintain marginal 

saturations.  Remdesivir was administered for four days along with oral 

antibiotics and Decadron and p.r.n. meds.  She did not improve.  She still had 

worsening respiratory failure.  She had elected to go to hospice while she was 

awake.  Therefore, on the seventh hospital day, she was administered morphine 

along with Ativan.  She was discharged to the hospice for end of life care.  We 

will hold off any further medications, further narcotics and anxiety meds per 

the hospice inpatient group.  She was discharged then to hospice care with end 

of life care with the terminal prognosis.



TOTAL DISCHARGE TIME SPENT:  38 minutes.







TANO

DR: Shaye   DD: 10/15/2021 10:21

DT: 10/15/2021 13:32   TID: [id number]

CC:     LOREN MCMANUS MD

## 2021-10-15 NOTE — NUR
PT TRANSFERRED TO IN [facility]OSPICE [facility] SHIFT AT [id number]. PT AWARE AND ABLE TO TALK TO AND 
SIGN REQUIRED DOCUMENTS FOR [facility]. NEW ADMIT ORDERS GIVEN. PTS FENTANYL PATCH 
REMOVED 10/15/21 AND WASTED WITH CHARGE RN DUE TO PT BEING ON CONTINUOUS PCA MORPHINE PUMP. 
PT HAS DECREASED APPETITE STILL HOWEVER HAS DRANK ENSURE FOR EVERY MEAL WITH WATER IN 
BETWEEN. FOLLOW CATHETER IN PLACE FOR COMFORT MEASURES.TRIED TO CONTACT PTS FRIEND ALISIA 
TO NOTIFY HER OF UPDATES PER PTS REQUEST. ALISIA HAS PTS PASSCODE. WILL CONTINUE TO 
MONITOR PT.

## 2021-10-16 VITALS — DIASTOLIC BLOOD PRESSURE: 56 MMHG | SYSTOLIC BLOOD PRESSURE: 122 MMHG

## 2021-10-16 VITALS — SYSTOLIC BLOOD PRESSURE: 111 MMHG | DIASTOLIC BLOOD PRESSURE: 56 MMHG

## 2021-10-16 VITALS — SYSTOLIC BLOOD PRESSURE: 118 MMHG | DIASTOLIC BLOOD PRESSURE: 62 MMHG

## 2021-10-16 VITALS — SYSTOLIC BLOOD PRESSURE: 119 MMHG | DIASTOLIC BLOOD PRESSURE: 59 MMHG

## 2021-10-16 VITALS — DIASTOLIC BLOOD PRESSURE: 62 MMHG | SYSTOLIC BLOOD PRESSURE: 124 MMHG

## 2021-10-16 VITALS — SYSTOLIC BLOOD PRESSURE: 117 MMHG | DIASTOLIC BLOOD PRESSURE: 60 MMHG

## 2021-10-16 VITALS — SYSTOLIC BLOOD PRESSURE: 112 MMHG | DIASTOLIC BLOOD PRESSURE: 57 MMHG

## 2021-10-16 VITALS — DIASTOLIC BLOOD PRESSURE: 57 MMHG | SYSTOLIC BLOOD PRESSURE: 109 MMHG

## 2021-10-16 VITALS — DIASTOLIC BLOOD PRESSURE: 67 MMHG | SYSTOLIC BLOOD PRESSURE: 112 MMHG

## 2021-10-16 VITALS — SYSTOLIC BLOOD PRESSURE: 118 MMHG | DIASTOLIC BLOOD PRESSURE: 55 MMHG

## 2021-10-16 VITALS — SYSTOLIC BLOOD PRESSURE: 106 MMHG | DIASTOLIC BLOOD PRESSURE: 58 MMHG

## 2021-10-16 VITALS — SYSTOLIC BLOOD PRESSURE: 104 MMHG | DIASTOLIC BLOOD PRESSURE: 54 MMHG

## 2021-10-16 PROCEDURE — 5A0935A ASSISTANCE WITH RESPIRATORY VENTILATION, LESS THAN 24 CONSECUTIVE HOURS, HIGH NASAL FLOW/VELOCITY: ICD-10-PCS | Performed by: HOSPITALIST

## 2021-10-16 RX ADMIN — MORPHINE SULFATE PRN MLS/HR: 1 INJECTION INTRAVENOUS at 20:09

## 2021-10-16 RX ADMIN — MORPHINE SULFATE PRN MLS/HR: 1 INJECTION INTRAVENOUS at 14:27

## 2021-10-16 RX ADMIN — MORPHINE SULFATE PRN MLS/HR: 1 INJECTION INTRAVENOUS at 07:08

## 2021-10-16 RX ADMIN — MORPHINE SULFATE PRN MLS/HR: 1 INJECTION INTRAVENOUS at 04:10

## 2021-10-16 RX ADMIN — MORPHINE SULFATE PRN MLS/HR: 1 INJECTION INTRAVENOUS at 06:30

## 2021-10-16 NOTE — NUR
pts nephew called with passcode from [place] for update. Guero Mujica [phone]. 
Chiki stated that he would call Parag Patricia in [facility] to see if he wanted updates on 
the pt since he is closest to the pt and the only family in town. Pts nephew Chiki stated he 
would contact his sisters and give updates to family.

## 2021-10-16 NOTE — NUR
Nursing note:



Pt O2 sats steadily declining from approx. 0200, from 95% on 4L NC to 87-90%. At 0400, 
scheduled ativan given, pt oral care and light suctioning completed. Pt continued to desat 
to 77%; morphine PCA increased to 3mg/h from 2 base rate to decrease air hunger. Pt resting 
in bed at this time.

## 2021-10-16 NOTE — NUR
Pt resting comfortably this shift with morphine gtt rate at 4mg/h from 2 base rate. pt 
oxygen sats declining from start of shift. Oral care provided frequently and scheduled 
Ativan given as well. [place] friend given updates. Pt remains on in hospice with [facility].

## 2021-10-16 NOTE — PN
DATE: 10/15/2021

ATTENDING PHYSICIAN:  Dr. Kinney.



The patient was discharged to hospice yesterday.  I am writing a note while she 

is still in the hospital.



SUBJECTIVE:  She is obtunded and sedated.



OBJECTIVE FINDINGS:

VITAL SIGNS:  Blood pressure this morning is 110 systolic.  Her oxygen 

saturation is marginal.  Her pulse rate is adequate.  She is afebrile.

HEENT:  Head is without trauma.  Pupils are reactive.  Sclerae nonicteric.  

Oropharynx clear.

NECK:  Supple.

LUNGS:  Respirations are shallow.

CARDIOVASCULAR:  Showed distant heart tones.

ABDOMEN:  Soft.

EXTREMITIES:  Without edema.

NEUROLOGIC:  She is sedated and bedridden.



ASSESSMENT:

1.  This 86-year-old female has COVID pneumonia bilaterally.

2.  Acute respiratory failure with significant scarring in the lungs.

3.  Significant hypoxemia requiring high flow oxygen.

4.  Rheumatoid arthritis.

5.  Generalized debilitation.



PLAN:

1.  Comfort measures with hospice.

2.  Morphine for air hunger.

3.  Her friend is at the bedside to keep a roldan.







BRIDGET/LILI

DR: Shaye   DD: 10/16/2021 09:44

DT: 10/16/2021 14:27   TID: [id number]

CC:     LOREN MCMANUS MD

## 2021-10-17 NOTE — NUR
All pt death notifications and protocols complete; body and all pt belongings released to 
[facility] at 0147.

## 2021-10-17 NOTE — DS
DATE OF DISCHARGE: 10/17/2021

ATTENDING PHYSICIAN:  Dr. Kinney.



FINAL DISCHARGE DIAGNOSES:

1.  Bilateral COVID pneumonia.

2.  Acute respiratory failure with significant scarring.

3.  Hypoxemia, requiring high-flow oxygen.

4.  Longstanding rheumatoid arthritis.

5.  Immunocompromised.

6.  Generalized debilitation.



HISTORY AND PHYSICAL:  The patient is an 86-year-old female with COVID 

pneumonia.  She has worsening symptoms of hypoxemia. She requested to be a DNR 

and wanted hospice care.  She was hospitalized on the acute floor from 10/08 

through 10/15, discharged to hospice on the 15th for comfort measures.



PHYSICAL EXAMINATION:  Please see the dictated note.



PERTINENT LABORATORY AND X-RAY STUDIES:  From the last database, please refer to

the last admission.  We kept her comfortable with morphine, Ativan, and 

supplemental oxygen.



COURSE IN THE HOSPITAL:  The patient was kept comfortable with p.r.n. morphine 

and Ativan, supplemental oxygen.  She remained in a semi-comatose state, some 

colleagues from work, but there were no family members unfortunately here to say

goodbye to her. Early on the morning of 10/17, the patient succumbed to her 

illness.  She was pronounced dead at 0030 hours.  Next of kin were notified, her

body was released.







MARY

DR: JESSENIA/nancy   DD: 10/17/2021 08:10

DT: 10/17/2021 08:31   TID: [id number]

CC:     LOREN MCMANUS MD

## 2021-10-17 NOTE — NUR
Nursing note re: morphine admin:



Morphine syringe "administered" in EMAR on 10/15-10/16 at 1845, 0410, and 0630. New morphine 
syringes not removed from pyxis or administered to patient at these times; morphine was 
"administered" in EMAR in attempt to reflect rate changes. This RN was made aware of her 
error and unable to undo these administrations on EMAR. Spoke to [facility], at 
0545 10/17 who stated that, if unable to undo administrations, write nursing note on the 
events.

## 2021-10-17 NOTE — NUR
Steady decline throughout the night; pt unresponsive at all times; vital signs diminished 
rapidly at approx 0030 with complete cessation at 0038, Marylou RN and Robles RN in 
attendance; all equipment removed from pt, peripheral IV removed with cath tip intact; smith 
cath d/c'd after deflation of saline balloon; Jazzy RN from [facility] notified at 0048; 
additional notifications in progress.